# Patient Record
Sex: MALE
[De-identification: names, ages, dates, MRNs, and addresses within clinical notes are randomized per-mention and may not be internally consistent; named-entity substitution may affect disease eponyms.]

---

## 2022-11-07 ENCOUNTER — APPOINTMENT (OUTPATIENT)
Dept: UROLOGY | Facility: CLINIC | Age: 47
End: 2022-11-07

## 2022-11-07 VITALS
BODY MASS INDEX: 25.5 KG/M2 | TEMPERATURE: 97 F | SYSTOLIC BLOOD PRESSURE: 122 MMHG | WEIGHT: 168.25 LBS | DIASTOLIC BLOOD PRESSURE: 81 MMHG | HEIGHT: 68 IN | RESPIRATION RATE: 17 BRPM | HEART RATE: 60 BPM | OXYGEN SATURATION: 98 %

## 2022-11-07 DIAGNOSIS — N46.9 MALE INFERTILITY, UNSPECIFIED: ICD-10-CM

## 2022-11-07 DIAGNOSIS — N50.819 TESTICULAR PAIN, UNSPECIFIED: ICD-10-CM

## 2022-11-07 DIAGNOSIS — R68.82 DECREASED LIBIDO: ICD-10-CM

## 2022-11-07 DIAGNOSIS — N50.0 ATROPHY OF TESTIS: ICD-10-CM

## 2022-11-07 DIAGNOSIS — I86.1 SCROTAL VARICES: ICD-10-CM

## 2022-11-07 DIAGNOSIS — N46.11 ORGANIC OLIGOSPERMIA: ICD-10-CM

## 2022-11-07 PROBLEM — Z00.00 ENCOUNTER FOR PREVENTIVE HEALTH EXAMINATION: Status: ACTIVE | Noted: 2022-11-07

## 2022-11-07 LAB
ESTRADIOL SERPL-MCNC: 9 PG/ML
FSH SERPL-MCNC: 5.4 IU/L
LH SERPL-ACNC: 4.2 IU/L
PROLACTIN SERPL-MCNC: 6.1 NG/ML

## 2022-11-07 PROCEDURE — 76870 US EXAM SCROTUM: CPT

## 2022-11-07 PROCEDURE — 89321 SEMEN ANAL SPERM DETECTION: CPT | Mod: QW

## 2022-11-07 PROCEDURE — 93976 VASCULAR STUDY: CPT

## 2022-11-07 PROCEDURE — 99204 OFFICE O/P NEW MOD 45 MIN: CPT

## 2022-11-07 NOTE — PHYSICAL EXAM
[General Appearance - Well Developed] : well developed [General Appearance - Well Nourished] : well nourished [Normal Appearance] : normal appearance [Well Groomed] : well groomed [General Appearance - In No Acute Distress] : no acute distress [Edema] : no peripheral edema [Respiration, Rhythm And Depth] : normal respiratory rhythm and effort [Exaggerated Use Of Accessory Muscles For Inspiration] : no accessory muscle use [Abdomen Soft] : soft [Abdomen Tenderness] : non-tender [Costovertebral Angle Tenderness] : no ~M costovertebral angle tenderness [Urethral Meatus] : meatus normal [Urinary Bladder Findings] : the bladder was normal on palpation [Scrotum] : the scrotum was normal [Normal Station and Gait] : the gait and station were normal for the patient's age [] : no rash [No Focal Deficits] : no focal deficits [Oriented To Time, Place, And Person] : oriented to person, place, and time [Affect] : the affect was normal [Mood] : the mood was normal [Not Anxious] : not anxious [No Palpable Adenopathy] : no palpable adenopathy [FreeTextEntry1] : bilateral grade 3 varicoceles, left atrophic testis

## 2022-11-07 NOTE — HISTORY OF PRESENT ILLNESS
[FreeTextEntry1] : Mr. EDGARD DIAS is a 47 year male w/ a recent hx of infertility. Also complains of libido\par \par Currently 2yo daughter with current partner. \par \par He has tried to conceive for about 3 years since the birth of their last child. Partner also had a miscarriage a year ago.\par \par His current partner is 37 years old. \par Name: Cintia Woodward\par : 1984\par \par She has been seen by [facility] specialist in [facility]. She has no relevant medical problems affecting her fertility. \par \par He has normal erectile function. Has nocturnal erections. \par \par No hx of anabolic steroid use, chemotherapy, or testosterone replacement. \par No hx of prior trauma to genitalia\par No known chromosomal/genetic mutations\par No known family hx of infertility. \par \par Had a semen analysis at a remote outside institution and recalls his semen count was roughly around 9 million.  \par \par \par

## 2022-11-07 NOTE — ASSESSMENT
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 \par Discussed IR embolization / lap \par \par Rec he sees second opinion to put his mind at ease. \par \par Perioperative mortality literature discussed - very low in his case. \par \par \par This patient has varicocele. \par Presence of varicocele is associated with infertility, testis atrophy, low testosterone, testicular pain and erectile dysfunction.\par I have discussed effects of varicocele on different aspects of health and disease. \par I reviewed different treatment options including not performing any treatment at all.\par Surgical and non-surgical therapy was discussed. \par Patient wanted to proceed with microsurgical repair. \par The procedure itself, its risks including but not limited to bleeding, infection, wound dehiscence and issues, scar, postoperative pain, postoperative hydrocele, lack of improvement of semen parameters, lack of improvement of testosterone levels, and lack of improvement of other complaints was discussed. \par Risks of anesthesia including death was discussed. \par Patient understood risks, benefits, and alternatives to the surgery and wanted to proceed with surgery. \par All questions were answered. \par \par I had a long discussion with the patient about the reasons for male infertility. \par \par Infertility is defined as inability to achieve pregnancy after at least 12 months of trying.  Infertility can be due to female or male factor.  Female factors are menopause, early  ovarian failure, irregular menses, PCOS, anatomic abnormalities of the vagina and uterus.  With age and especially after the age of 35 there is a higher chance of spontaneous pregnancy abortions and higher rate of chromosomal abnormalities and longer time to get pregnant.\par \par The male factor infertility are due to problems with male.  They often can be reflected in abnormal semen analysis.  Lack of sperm in the ejaculate on microscopic examination does not mean that sperm is not present in the testicle.  Such situation is called azoospermia.  In men with azoospermia we will perform genetic evaluation including karyotype to exclude genetic reasons for male infertility.  Other than genetic, the reasons for lack of sperm in the ejaculate can be idiopathic, chemotherapy, radiation therapy and medical therapy.  If no correctable reasons for male infertility and azoospermia are found then microsurgical testicular sperm extraction is performed.  This procedure needs to be coordinated with patient's female partner Dr. park.  Not every sperm bank or cryo bank preserves and processes sperm from testicular biopsies.\par \par If sperm is present in the ejaculate typically there may be a correctable reasons for male infertility like low testosterone, elevated prolactin, varicocele, partial ejaculatory duct obstruction and infection in seminal vesicles and prostate.\par \par In such situations the work-up consists of scrotal ultrasound if physical examination is not revealing or difficult to perform. Full hormonal evaluation is performed including testosterone, FSH, LH, prolactin, estradiol and others as needed.  Varicose veins of the scrotum have been shown to be frequent cause of secondary and primary infertility.  Repair of varicocele can restore and improve sperm production in majority of men if varicocele is large.  I do not repair small varicoceles as I do not believe they are clinically significant.\par \par I have also discussed with the patient that typically we recommend 2-3 semen analysis as result of semen analysis in a single man can dramatically vary.  If patient had history of febrile disease within 2 to 3 months from semen analysis it is likely that acute stress negatively affected sperm quality.\par \par Although controversial sperm DNA integrity assay may aid in planing for optimal management of the couple. \par \par Patient will follow-up with me to review his results and plan next steps accordingly.\par \par

## 2022-11-10 LAB
TESTOST FREE SERPL-MCNC: 12.9 PG/ML
TESTOST SERPL-MCNC: 350 NG/DL

## 2022-12-13 ENCOUNTER — APPOINTMENT (OUTPATIENT)
Dept: UROLOGY | Facility: CLINIC | Age: 47
End: 2022-12-13

## 2023-02-02 ENCOUNTER — APPOINTMENT (OUTPATIENT)
Dept: UROLOGY | Facility: HOSPITAL | Age: 48
End: 2023-02-02

## 2023-07-18 ENCOUNTER — APPOINTMENT (OUTPATIENT)
Dept: UROLOGY | Facility: CLINIC | Age: 48
End: 2023-07-18